# Patient Record
Sex: MALE | Race: WHITE | Employment: OTHER | URBAN - METROPOLITAN AREA
[De-identification: names, ages, dates, MRNs, and addresses within clinical notes are randomized per-mention and may not be internally consistent; named-entity substitution may affect disease eponyms.]

---

## 2023-07-04 ENCOUNTER — HOSPITAL ENCOUNTER (INPATIENT)
Facility: HOSPITAL | Age: 78
LOS: 2 days | Discharge: NURSING, REHAB OR LONG-TERM CARE | End: 2023-07-07
Attending: EMERGENCY MEDICINE | Admitting: INTERNAL MEDICINE
Payer: MEDICARE

## 2023-07-04 ENCOUNTER — APPOINTMENT (OUTPATIENT)
Dept: CT IMAGING | Facility: HOSPITAL | Age: 78
End: 2023-07-04
Attending: EMERGENCY MEDICINE
Payer: MEDICARE

## 2023-07-04 DIAGNOSIS — R53.1 RIGHT SIDED WEAKNESS: ICD-10-CM

## 2023-07-04 DIAGNOSIS — I63.9 ACUTE CVA (CEREBROVASCULAR ACCIDENT) (HCC): Primary | ICD-10-CM

## 2023-07-04 LAB
ALBUMIN SERPL-MCNC: 3.3 G/DL (ref 3.4–5)
ALP LIVER SERPL-CCNC: 61 U/L
ALT SERPL-CCNC: 27 U/L
ANION GAP SERPL CALC-SCNC: 5 MMOL/L (ref 0–18)
APTT PPP: 38 SECONDS (ref 23.3–35.6)
AST SERPL-CCNC: 20 U/L (ref 15–37)
BASOPHILS # BLD AUTO: 0.05 X10(3) UL (ref 0–0.2)
BASOPHILS NFR BLD AUTO: 0.5 %
BILIRUB DIRECT SERPL-MCNC: 0.1 MG/DL (ref 0–0.2)
BILIRUB SERPL-MCNC: 0.8 MG/DL (ref 0.1–2)
BUN BLD-MCNC: 24 MG/DL (ref 7–18)
BUN/CREAT SERPL: 19.5 (ref 10–20)
CALCIUM BLD-MCNC: 8.8 MG/DL (ref 8.5–10.1)
CHLORIDE SERPL-SCNC: 109 MMOL/L (ref 98–112)
CO2 SERPL-SCNC: 28 MMOL/L (ref 21–32)
CREAT BLD-MCNC: 1.23 MG/DL
DEPRECATED RDW RBC AUTO: 44.2 FL (ref 35.1–46.3)
EOSINOPHIL # BLD AUTO: 0.09 X10(3) UL (ref 0–0.7)
EOSINOPHIL NFR BLD AUTO: 0.9 %
ERYTHROCYTE [DISTWIDTH] IN BLOOD BY AUTOMATED COUNT: 13.6 % (ref 11–15)
GFR SERPLBLD BASED ON 1.73 SQ M-ARVRAT: 61 ML/MIN/1.73M2 (ref 60–?)
GLUCOSE BLD-MCNC: 105 MG/DL (ref 70–99)
GLUCOSE BLDC GLUCOMTR-MCNC: 101 MG/DL (ref 70–99)
HCT VFR BLD AUTO: 42.8 %
HGB BLD-MCNC: 14.3 G/DL
IMM GRANULOCYTES # BLD AUTO: 0.03 X10(3) UL (ref 0–1)
IMM GRANULOCYTES NFR BLD: 0.3 %
INR BLD: 1.12 (ref 0.85–1.16)
LYMPHOCYTES # BLD AUTO: 1.55 X10(3) UL (ref 1–4)
LYMPHOCYTES NFR BLD AUTO: 15.5 %
MCH RBC QN AUTO: 29.4 PG (ref 26–34)
MCHC RBC AUTO-ENTMCNC: 33.4 G/DL (ref 31–37)
MCV RBC AUTO: 87.9 FL
MONOCYTES # BLD AUTO: 0.76 X10(3) UL (ref 0.1–1)
MONOCYTES NFR BLD AUTO: 7.6 %
NEUTROPHILS # BLD AUTO: 7.49 X10 (3) UL (ref 1.5–7.7)
NEUTROPHILS # BLD AUTO: 7.49 X10(3) UL (ref 1.5–7.7)
NEUTROPHILS NFR BLD AUTO: 75.2 %
OSMOLALITY SERPL CALC.SUM OF ELEC: 298 MOSM/KG (ref 275–295)
PLATELET # BLD AUTO: 446 10(3)UL (ref 150–450)
POTASSIUM SERPL-SCNC: 4.8 MMOL/L (ref 3.5–5.1)
PROT SERPL-MCNC: 6.9 G/DL (ref 6.4–8.2)
PROTHROMBIN TIME: 14.3 SECONDS (ref 11.6–14.8)
RBC # BLD AUTO: 4.87 X10(6)UL
SARS-COV-2 RNA RESP QL NAA+PROBE: NOT DETECTED
SODIUM SERPL-SCNC: 142 MMOL/L (ref 136–145)
WBC # BLD AUTO: 10 X10(3) UL (ref 4–11)

## 2023-07-04 PROCEDURE — 70496 CT ANGIOGRAPHY HEAD: CPT | Performed by: EMERGENCY MEDICINE

## 2023-07-04 PROCEDURE — 70450 CT HEAD/BRAIN W/O DYE: CPT | Performed by: EMERGENCY MEDICINE

## 2023-07-04 PROCEDURE — 70498 CT ANGIOGRAPHY NECK: CPT | Performed by: EMERGENCY MEDICINE

## 2023-07-04 RX ORDER — DIPHENHYDRAMINE HYDROCHLORIDE 50 MG/ML
50 INJECTION INTRAMUSCULAR; INTRAVENOUS ONCE AS NEEDED
Status: ACTIVE | OUTPATIENT
Start: 2023-07-04 | End: 2023-07-04

## 2023-07-04 RX ORDER — FAMOTIDINE 10 MG/ML
20 INJECTION, SOLUTION INTRAVENOUS ONCE AS NEEDED
Status: ACTIVE | OUTPATIENT
Start: 2023-07-04 | End: 2023-07-04

## 2023-07-04 RX ORDER — METHYLPREDNISOLONE SODIUM SUCCINATE 125 MG/2ML
125 INJECTION, POWDER, LYOPHILIZED, FOR SOLUTION INTRAMUSCULAR; INTRAVENOUS ONCE AS NEEDED
Status: ACTIVE | OUTPATIENT
Start: 2023-07-04 | End: 2023-07-04

## 2023-07-04 RX ORDER — HYDRALAZINE HYDROCHLORIDE 20 MG/ML
10 INJECTION INTRAMUSCULAR; INTRAVENOUS ONCE
Status: COMPLETED | OUTPATIENT
Start: 2023-07-04 | End: 2023-07-04

## 2023-07-05 ENCOUNTER — APPOINTMENT (OUTPATIENT)
Dept: CT IMAGING | Facility: HOSPITAL | Age: 78
End: 2023-07-05
Attending: STUDENT IN AN ORGANIZED HEALTH CARE EDUCATION/TRAINING PROGRAM
Payer: MEDICARE

## 2023-07-05 ENCOUNTER — APPOINTMENT (OUTPATIENT)
Dept: MRI IMAGING | Facility: HOSPITAL | Age: 78
End: 2023-07-05
Attending: STUDENT IN AN ORGANIZED HEALTH CARE EDUCATION/TRAINING PROGRAM
Payer: MEDICARE

## 2023-07-05 ENCOUNTER — APPOINTMENT (OUTPATIENT)
Dept: CV DIAGNOSTICS | Facility: HOSPITAL | Age: 78
End: 2023-07-05
Attending: STUDENT IN AN ORGANIZED HEALTH CARE EDUCATION/TRAINING PROGRAM
Payer: MEDICARE

## 2023-07-05 PROBLEM — R47.81 SLURRED SPEECH: Status: ACTIVE | Noted: 2023-07-05

## 2023-07-05 LAB
ATRIAL RATE: 67 BPM
CHOLEST SERPL-MCNC: 138 MG/DL (ref ?–200)
EST. AVERAGE GLUCOSE BLD GHB EST-MCNC: 111 MG/DL (ref 68–126)
GLUCOSE BLDC GLUCOMTR-MCNC: 101 MG/DL (ref 70–99)
GLUCOSE BLDC GLUCOMTR-MCNC: 105 MG/DL (ref 70–99)
GLUCOSE BLDC GLUCOMTR-MCNC: 110 MG/DL (ref 70–99)
GLUCOSE BLDC GLUCOMTR-MCNC: 92 MG/DL (ref 70–99)
HBA1C MFR BLD: 5.5 % (ref ?–5.7)
HDLC SERPL-MCNC: 35 MG/DL (ref 40–59)
LDLC SERPL CALC-MCNC: 64 MG/DL (ref ?–100)
NONHDLC SERPL-MCNC: 103 MG/DL (ref ?–130)
P AXIS: 64 DEGREES
P-R INTERVAL: 154 MS
Q-T INTERVAL: 420 MS
QRS DURATION: 86 MS
QTC CALCULATION (BEZET): 443 MS
R AXIS: -7 DEGREES
T AXIS: 38 DEGREES
TRIGL SERPL-MCNC: 239 MG/DL (ref 30–149)
VENTRICULAR RATE: 67 BPM
VLDLC SERPL CALC-MCNC: 36 MG/DL (ref 0–30)

## 2023-07-05 PROCEDURE — 70450 CT HEAD/BRAIN W/O DYE: CPT | Performed by: STUDENT IN AN ORGANIZED HEALTH CARE EDUCATION/TRAINING PROGRAM

## 2023-07-05 PROCEDURE — 99223 1ST HOSP IP/OBS HIGH 75: CPT | Performed by: STUDENT IN AN ORGANIZED HEALTH CARE EDUCATION/TRAINING PROGRAM

## 2023-07-05 PROCEDURE — 93306 TTE W/DOPPLER COMPLETE: CPT | Performed by: STUDENT IN AN ORGANIZED HEALTH CARE EDUCATION/TRAINING PROGRAM

## 2023-07-05 PROCEDURE — 99223 1ST HOSP IP/OBS HIGH 75: CPT | Performed by: INTERNAL MEDICINE

## 2023-07-05 PROCEDURE — 70551 MRI BRAIN STEM W/O DYE: CPT | Performed by: STUDENT IN AN ORGANIZED HEALTH CARE EDUCATION/TRAINING PROGRAM

## 2023-07-05 RX ORDER — ATORVASTATIN CALCIUM 20 MG/1
20 TABLET, FILM COATED ORAL DAILY
Status: DISCONTINUED | OUTPATIENT
Start: 2023-07-05 | End: 2023-07-05

## 2023-07-05 RX ORDER — ONDANSETRON 2 MG/ML
4 INJECTION INTRAMUSCULAR; INTRAVENOUS EVERY 6 HOURS PRN
Status: DISCONTINUED | OUTPATIENT
Start: 2023-07-05 | End: 2023-07-07

## 2023-07-05 RX ORDER — ACETAMINOPHEN 325 MG/1
650 TABLET ORAL EVERY 4 HOURS PRN
Status: DISCONTINUED | OUTPATIENT
Start: 2023-07-05 | End: 2023-07-07

## 2023-07-05 RX ORDER — ATORVASTATIN CALCIUM 80 MG/1
80 TABLET, FILM COATED ORAL NIGHTLY
Status: DISCONTINUED | OUTPATIENT
Start: 2023-07-05 | End: 2023-07-07

## 2023-07-05 RX ORDER — LABETALOL HYDROCHLORIDE 5 MG/ML
10 INJECTION, SOLUTION INTRAVENOUS EVERY 10 MIN PRN
Status: DISCONTINUED | OUTPATIENT
Start: 2023-07-05 | End: 2023-07-07

## 2023-07-05 RX ORDER — METOCLOPRAMIDE HYDROCHLORIDE 5 MG/ML
10 INJECTION INTRAMUSCULAR; INTRAVENOUS EVERY 8 HOURS PRN
Status: DISCONTINUED | OUTPATIENT
Start: 2023-07-05 | End: 2023-07-07

## 2023-07-05 RX ORDER — ATORVASTATIN CALCIUM 80 MG/1
80 TABLET, FILM COATED ORAL NIGHTLY
Status: DISCONTINUED | OUTPATIENT
Start: 2023-07-05 | End: 2023-07-05

## 2023-07-05 RX ORDER — LABETALOL HYDROCHLORIDE 5 MG/ML
INJECTION, SOLUTION INTRAVENOUS
Status: DISCONTINUED
Start: 2023-07-05 | End: 2023-07-05 | Stop reason: WASHOUT

## 2023-07-05 RX ORDER — ENOXAPARIN SODIUM 100 MG/ML
40 INJECTION SUBCUTANEOUS DAILY
Status: DISCONTINUED | OUTPATIENT
Start: 2023-07-05 | End: 2023-07-07

## 2023-07-05 RX ORDER — HYDRALAZINE HYDROCHLORIDE 20 MG/ML
10 INJECTION INTRAMUSCULAR; INTRAVENOUS EVERY 2 HOUR PRN
Status: DISCONTINUED | OUTPATIENT
Start: 2023-07-05 | End: 2023-07-07

## 2023-07-05 RX ORDER — ACETAMINOPHEN 650 MG/1
650 SUPPOSITORY RECTAL EVERY 4 HOURS PRN
Status: DISCONTINUED | OUTPATIENT
Start: 2023-07-05 | End: 2023-07-07

## 2023-07-05 RX ORDER — PANTOPRAZOLE SODIUM 40 MG/1
40 TABLET, DELAYED RELEASE ORAL
COMMUNITY

## 2023-07-05 RX ORDER — ASPIRIN 81 MG/1
81 TABLET ORAL DAILY
Status: DISCONTINUED | OUTPATIENT
Start: 2023-07-05 | End: 2023-07-07

## 2023-07-05 RX ORDER — HYDRALAZINE HYDROCHLORIDE 20 MG/ML
INJECTION INTRAMUSCULAR; INTRAVENOUS
Status: DISCONTINUED
Start: 2023-07-05 | End: 2023-07-05 | Stop reason: WASHOUT

## 2023-07-05 NOTE — ED QUICK NOTES
This RN and another provided assistance to patient with standing to urinate per patient request. Patient standing with assistance at side of bedside, yet not steady on feet. Weakness noted. Informed family and patient that urinal should be used in the bed. Plan for inpatient admission.

## 2023-07-05 NOTE — ED INITIAL ASSESSMENT (HPI)
Patient here from home for stroke like symptoms starting at 1700. Right arm is flaccid, right leg weakness, right side facial droop.

## 2023-07-05 NOTE — OCCUPATIONAL THERAPY NOTE
Patient presents with CVA; rcvd TNK @~2100 7/4; per rehab protocol, hold 24 hours post administration; will follow up 7/6.      Mohan Varghese, Occupational Therapist, OTR/L ext [phone]

## 2023-07-05 NOTE — CDS QUERY
How to answer this Query:    1.) DON'T CLICK COSIGN BUTTON FIRST  2.) Click \"3 dots. [place] \" to the right of cosign button and click EDIT on the toolbar.  2.) Type an \"X\" in the bracket for the diagnosis that applies. (You may also add additional clinical details as you feel necessary to substantiate your response). 3.) Finally click \"Sign\" to complete response. Thank You  CLINICAL DOCUMENTATION CLARIFICATION FORM  Dear Sarah Ward    A CAUSE AND EFFECT RELATIONSHIP MAY NOT BE ASSUMED AND MUST BE DOCUMENTED BY A PROVIDER  PLEASE CLARIFY THE RELATIONSHIP, IF ANY, BETWEEN RIGHT SIDE WEAKNESS AND CVA  ( ) DUE TO OR ASSOCIATED WITH EACH OTHER  ( ) UNRELATED TO EACH OTHER  ( ) OTHER     Documentation suggests cerebrovascular disease: [address]=Acute CVA (cerebrovascular accident) ([facility] [address] s/p TNKase  MRI revealed acute infarct , repeat CT if stable start aspirin  Right sided weakness      Other: PT/OT SPEECH EVALS     If you have any questions, please contact Clinical :  Gene Villavicencio RN at [phone]     Thank You!     THIS FORM IS A PERMANENT PART OF THE MEDICAL RECORD

## 2023-07-05 NOTE — PLAN OF CARE
Problem: SAFETY ADULT - FALL  Goal: Free from fall injury  Description: INTERVENTIONS:  - Assess pt frequently for physical needs  - Identify cognitive and physical deficits and behaviors that affect risk of falls. - [place] fall precautions as indicated by assessment.  - Educate pt/family on patient safety including physical limitations  - Instruct pt to call for assistance with activity based on assessment  - Modify environment to reduce risk of injury  - Provide assistive devices as appropriate  - Consider OT/PT consult to assist with strengthening/mobility  - Encourage toileting schedule  Outcome: Progressing     Problem: CARDIOVASCULAR - ADULT  Goal: Maintains optimal cardiac output and hemodynamic stability  Description: INTERVENTIONS:  - Monitor vital signs, rhythm, and trends  - Monitor for bleeding, hypotension and signs of decreased cardiac output  - Evaluate effectiveness of vasoactive medications to optimize hemodynamic stability  - Monitor arterial and/or venous puncture sites for bleeding and/or hematoma  - Assess quality of pulses, skin color and temperature  - Assess for signs of decreased coronary artery perfusion - ex.  Angina  - Evaluate fluid balance, assess for edema, trend weights  Outcome: Progressing  Goal: Absence of cardiac arrhythmias or at baseline  Description: INTERVENTIONS:  - Continuous cardiac monitoring, monitor vital signs, obtain 12 lead EKG if indicated  - Evaluate effectiveness of antiarrhythmic and heart rate control medications as ordered  - Initiate emergency measures for life threatening arrhythmias  - Monitor electrolytes and administer replacement therapy as ordered  Outcome: Progressing     Problem: NEUROLOGICAL - ADULT  Goal: Achieves stable or improved neurological status  Description: INTERVENTIONS  - Assess for and report changes in neurological status  - Initiate measures to prevent increased intracranial pressure  - Maintain blood pressure and fluid volume Results received. Spoke with mom. No questions   within ordered parameters to optimize cerebral perfusion and minimize risk of hemorrhage  - Monitor temperature, glucose, and sodium. Initiate appropriate interventions as ordered  Outcome: Progressing  Goal: Absence of seizures  Description: INTERVENTIONS  - Monitor for seizure activity  - Administer anti-seizure medications as ordered  - Monitor neurological status  Outcome: Progressing  Goal: Remains free of injury related to seizure activity  Description: INTERVENTIONS:  - Maintain airway, patient safety  and administer oxygen as ordered  - Monitor patient for seizure activity, document and report duration and description of seizure to MD/LIP  - If seizure occurs, turn patient to side and suction secretions as needed  - Reorient patient post seizure  - Seizure pads on all 4 side rails  - Instruct patient/family to notify RN of any seizure activity  - Instruct patient/family to call for assistance with activity based on assessment  Outcome: Progressing  Goal: Achieves maximal functionality and self care  Description: INTERVENTIONS  - Monitor swallowing and airway patency with patient fatigue and changes in neurological status  - Encourage and assist patient to increase activity and self care with guidance from PT/OT  - Encourage visually impaired, hearing impaired and aphasic patients to use assistive/communication devices  Outcome: Progressing     Problem: NEUROLOGICAL - ADULT  Goal: Achieves stable or improved neurological status  Description: INTERVENTIONS  - Assess for and report changes in neurological status  - Initiate measures to prevent increased intracranial pressure  - Maintain blood pressure and fluid volume within ordered parameters to optimize cerebral perfusion and minimize risk of hemorrhage  - Monitor temperature, glucose, and sodium.  Initiate appropriate interventions as ordered  Outcome: Progressing  Goal: Absence of seizures  Description: INTERVENTIONS  - Monitor for seizure activity  - Administer anti-seizure medications as ordered  - Monitor neurological status  Outcome: Progressing  Goal: Remains free of injury related to seizure activity  Description: INTERVENTIONS:  - Maintain airway, patient safety  and administer oxygen as ordered  - Monitor patient for seizure activity, document and report duration and description of seizure to MD/LIP  - If seizure occurs, turn patient to side and suction secretions as needed  - Reorient patient post seizure  - Seizure pads on all 4 side rails  - Instruct patient/family to notify RN of any seizure activity  - Instruct patient/family to call for assistance with activity based on assessment  Outcome: Progressing  Goal: Achieves maximal functionality and self care  Description: INTERVENTIONS  - Monitor swallowing and airway patency with patient fatigue and changes in neurological status  - Encourage and assist patient to increase activity and self care with guidance from PT/OT  - Encourage visually impaired, hearing impaired and aphasic patients to use assistive/communication devices  Outcome: Progressing     Problem: CARDIOVASCULAR - ADULT  Goal: Absence of cardiac arrhythmias or at baseline  Description: INTERVENTIONS:  - Continuous cardiac monitoring, monitor vital signs, obtain 12 lead EKG if indicated  - Evaluate effectiveness of antiarrhythmic and heart rate control medications as ordered  - Initiate emergency measures for life threatening arrhythmias  - Monitor electrolytes and administer replacement therapy as ordered  Outcome: Progressing   Rec'd pt from ER, A&Ox4, speech clear, lungs clear, bay, left arm and leg strong, rt arm and leg with weakness, able to move and lift each a few inches off bed, vs and neuro checklist followed thru nite, neuros remain stable with some waxing and waning of strength on right side, but able to lift rt arm and leg off bed again by shift change.   Safety measures maintained sr up with and call light in reach by left hand.

## 2023-07-05 NOTE — ED QUICK NOTES
No promximal clot, no major vessel occlusion. Diminished perfusion to distal L RCA territory, concerning regarding evolving stroke, confirm/rule out via mri.   Relayed information to inpt zoe Ochoa, to inform radiologist.

## 2023-07-05 NOTE — PLAN OF CARE
Pt A&O x4. Has right sided weakness as well as right facial droop. Went for [email or web address] and echo today. Will have repeat head CT at 2100. On Q1H neuro checks. Daily NIH. Family at bedside and updated on plan of care. Problem: HEMATOLOGIC - ADULT  Goal: Free from bleeding injury  Description: (Example usage: patient with low platelets)  INTERVENTIONS:  - Avoid intramuscular injections, enemas and rectal medication administration  - Ensure safe mobilization of patient  - Hold pressure on venipuncture sites to achieve adequate hemostasis  - Assess for signs and symptoms of internal bleeding  - Monitor lab trends  - Patient is to report abnormal signs of bleeding to staff  - Avoid use of toothpicks and dental floss  - Use electric shaver for shaving  - Use soft bristle tooth brush  - Limit straining and forceful nose blowing  Outcome: Progressing     Problem: SAFETY ADULT - FALL  Goal: Free from fall injury  Description: INTERVENTIONS:  - Assess pt frequently for physical needs  - Identify cognitive and physical deficits and behaviors that affect risk of falls.   - [place] fall precautions as indicated by assessment.  - Educate pt/family on patient safety including physical limitations  - Instruct pt to call for assistance with activity based on assessment  - Modify environment to reduce risk of injury  - Provide assistive devices as appropriate  - Consider OT/PT consult to assist with strengthening/mobility  - Encourage toileting schedule  Outcome: Progressing     Problem: CARDIOVASCULAR - ADULT  Goal: Maintains optimal cardiac output and hemodynamic stability  Description: INTERVENTIONS:  - Monitor vital signs, rhythm, and trends  - Monitor for bleeding, hypotension and signs of decreased cardiac output  - Evaluate effectiveness of vasoactive medications to optimize hemodynamic stability  - Monitor arterial and/or venous puncture sites for bleeding and/or hematoma  - Assess quality of pulses, skin color and temperature  - Assess for signs of decreased coronary artery perfusion - ex. Angina  - Evaluate fluid balance, assess for edema, trend weights  Outcome: Progressing     Problem: NEUROLOGICAL - ADULT  Goal: Absence of seizures  Description: INTERVENTIONS  - Monitor for seizure activity  - Administer anti-seizure medications as ordered  - Monitor neurological status  Outcome: Progressing  Goal: Remains free of injury related to seizure activity  Description: INTERVENTIONS:  - Maintain airway, patient safety  and administer oxygen as ordered  - Monitor patient for seizure activity, document and report duration and description of seizure to MD/LIP  - If seizure occurs, turn patient to side and suction secretions as needed  - Reorient patient post seizure  - Seizure pads on all 4 side rails  - Instruct patient/family to notify RN of any seizure activity  - Instruct patient/family to call for assistance with activity based on assessment  Outcome: Progressing     Problem: Patient Centered Care  Goal: Patient preferences are identified and integrated in the patient's plan of care  Description: Interventions:  - What would you like us to know as we care for you?  Speaks primarily Efren  - Provide timely, complete, and accurate information to patient/family  - Incorporate patient and family knowledge, values, beliefs, and cultural backgrounds into the planning and delivery of care  - Encourage patient/family to participate in care and decision-making at the level they choose  - Honor patient and family perspectives and choices  Outcome: Progressing     Problem: CARDIOVASCULAR - ADULT  Goal: Absence of cardiac arrhythmias or at baseline  Description: INTERVENTIONS:  - Continuous cardiac monitoring, monitor vital signs, obtain 12 lead EKG if indicated  - Evaluate effectiveness of antiarrhythmic and heart rate control medications as ordered  - Initiate emergency measures for life threatening arrhythmias  - Monitor electrolytes and administer replacement therapy as ordered  Outcome: Not Progressing     Problem: NEUROLOGICAL - ADULT  Goal: Achieves stable or improved neurological status  Description: INTERVENTIONS  - Assess for and report changes in neurological status  - Initiate measures to prevent increased intracranial pressure  - Maintain blood pressure and fluid volume within ordered parameters to optimize cerebral perfusion and minimize risk of hemorrhage  - Monitor temperature, glucose, and sodium.  Initiate appropriate interventions as ordered  Outcome: Not Progressing  Goal: Achieves maximal functionality and self care  Description: INTERVENTIONS  - Monitor swallowing and airway patency with patient fatigue and changes in neurological status  - Encourage and assist patient to increase activity and self care with guidance from PT/OT  - Encourage visually impaired, hearing impaired and aphasic patients to use assistive/communication devices  Outcome: Not Progressing     Problem: Patient/Family Goals  Goal: Patient/Family Long Term Goal  Description: Patient's Long Term Goal: regain right sided function    Interventions:  - See additional Care Plan goals for specific interventions  Outcome: Not Progressing  Goal: Patient/Family Short Term Goal  Description: Patient's Short Term Goal: increase mobilty    Interventions:   - See additional Care Plan goals for specific interventions  Outcome: Not Progressing

## 2023-07-05 NOTE — SLP NOTE
ADULT SWALLOWING EVALUATION    ASSESSMENT    ASSESSMENT/OVERALL IMPRESSION:  PPE REQUIRED. THIS THERAPIST WORE GLOVES, DROPLET MASK, AND GOGGLES FOR DURATION OF EVALUATION. HANDS WASHED UPON ENTRANCE/EXIT. SLP BSSE orders received and acknowledged. A swallow evaluation warranted secondary to stroke protocol. Pt afebrile with clear vocal quality, on room air, with oxygen saturation at 97. Pt with no prior hx of dysphagia at [address] [place] [address]. Pt positioned upright in bed with family at bedside. Oral [facility] exam completed and R sided facial droop observed with R sided weakness. Pt with adequate oral acceptance and intermittent impaired bilabial seal across trials. Pt with intact bite, mastication of solids, and timely A-P transit. Pt's swallow response appears timely with adequate hyolaryngeal elevation/excursion. No clinical signs of aspiration (e.g., immediate/delayed throat clear, immediate/delayed cough, wet vocal quality, increased O2 effort) observed across all trials of puree, soft solids, hard solids, and thin liquids. Oral/buccal cavities clear of residue following all trials. Oxygen status remained >95 t/o the entire evaluation. At this time, pt presents with mild oral dysphagia and probable pharyngeal dysfunction. Recommend a regular diet and thin liquids with strict adherence to safe swallowing compensatory strategies. Results and recommendations reviewed with RN and family. SLP collaborated with RN for MD diet orders. PLAN: SLP to f/u x2 meal assessments, monitor imaging, VFSS if clinically warranted, and SLE pending MRI results. RECOMMENDATIONS   Diet Recommendations - Solids: Regular  Diet Recommendations - Liquids: Thin Liquids      Compensatory Strategies Recommended: No straws; Slow rate; Alternate consistencies;Small bites and sips  Aspiration Precautions: Upright position; Slow rate;Small bites and sips; No straw  Medication Administration Recommendations: One pill at a time  Treatment Plan/Recommendations: Aspiration precautions  Discharge Recommendations/Plan: Undetermined    HISTORY   MEDICAL HISTORY  Reason for Referral: Stroke protocol    Problem List  Principal Problem:    Acute CVA (cerebrovascular accident) [facility])  Active Problems:    Right sided weakness      Past Medical History  Past Medical History:   Diagnosis Date    BPH 2011    cannot void so catheter indwelling but to have sg 1/11    CORONARY ARTERY DISEASE 2010    +heart scan score 300- stress test done    HYPERLIPIDEMIA     OTHER DISEASES     chr perf of rt tm hx of for 13 yr    OTHER DISEASES     chr lt inguinal hernia    OTHER DISEASES     poor dentition       Prior Living Situation: Home with spouse;Home with support  Diet Prior to Admission: Regular; Thin liquids  Precautions: Aspiration    Patient/Family Goals: Assess swallow for safest/least restrictive diet    SWALLOWING HISTORY  Current Diet Consistency: NPO  Dysphagia History: None at [facility]    Imaging Results:     CT BRAIN 7/4/23:  CONCLUSION:   1. No acute intracranial hemorrhage or conclusive evidence of extended large vessel infarction. 2. Nonspecific hypodensity in the left cerebral hemisphere may reflect subacute infarction. At This could be better delineated by MRI. 3. Senescent changes of parenchymal volume loss with sequela of chronic microvascular ischemic disease. There is also large vessel atherosclerosis. 4. Lesser incidental findings as above. This report was called immediately at 2057 hours to Emergency Department Pod 1 and discussed with the patient's ER physician, Dr. Rupa Gramajo. Dictated by (CST): Sangita Humphries MD on 7/04/2023 at 8:54 PM       Finalized by (CST): Sangita Humphries MD on 7/04/2023 at 8:58 PM       CTA BRAIN 7/4/23:  CONCLUSION:   1. No large vessel occlusion. Relative less vascularity in left terminal cortical MCA territory is concerning for involving left fronto parietal stroke.   Follow-up MRI recommended for further evaluation. This slight discrepancy with the preliminary   vision radiology report was called to the emergency department charge nurse Edith Courtney at 5:40 a.m. Dictated by (CST): Denise Hartman MD on 7/05/2023 at 5:10 AM       Finalized by (CST): Denise Hartman MD on 7/05/2023 at 5:49 AM        Marian Oconnor- remote   OBJECTIVE   ORAL MOTOR EXAMINATION  Dentition: Upper dentures; Lower dentures  Symmetry: Reduced right facial  Strength: Reduced right facial     Range of Motion: Within Functional Limits  Rate of Motion: Within Functional Limits    Voice Quality: Clear  Respiratory Status: Unlabored  Consistencies Trialed: Thin liquids;Puree; Soft solid;Hard solid  Method of Presentation: Self presentation;Staff/Clinician assistance;Spoon;Cup;Single sips  Patient Positioning: Upright;Midline    Oral Phase of Swallow: Impaired  Bolus Retrieval: Intact  Bilabial Seal: Impaired  Bolus Formation: Intact  Bolus Propulsion: Intact  Mastication: Intact       Pharyngeal Phase of Swallow: Within Functional Limits           (Please note: Silent aspiration cannot be evaluated clinically. Videofluoroscopic Swallow Study is required to rule-out silent aspiration.)    Esophageal Phase of Swallow: No complaints consistent with possible esophageal involvement      GOALS  Goal #1 The patient will tolerate regular consistency and thin liquids without overt signs or symptoms of aspiration with 100 % accuracy over 1-2 session(s). In Progress   Goal #2 The patient/family/caregiver will demonstrate understanding and implementation of aspiration precautions and swallow strategies independently over 1-2 session(s). In Progress   Goal #3 The patient will utilize compensatory strategies as outlined by  BSSE (clinical evaluation) including Slow rate, Small bites, L sided placement, Small sips, Alternate liquids/solids, No straws with minimal assistance 100 % of the time across 2 sessions.   In Progress   Goal #4 SLE pending MRI results. In Progress     FOLLOW UP  Treatment Plan/Recommendations: Aspiration precautions  Number of Visits to Meet Established Goals: 2  Follow Up Needed (Documentation Required): Yes  SLP Follow-up Date: 07/06/23    Thank you for your referral.   If you have any questions, please contact Karla Higuera, MIKE [place]  Speech Language Pathologist  Phone Number [phone]

## 2023-07-05 NOTE — ED QUICK NOTES
Bedside report received and introduction to family has been made. Patient remains on cardiac monitor.

## 2023-07-06 LAB
ANION GAP SERPL CALC-SCNC: 7 MMOL/L (ref 0–18)
BASOPHILS # BLD AUTO: 0.03 X10(3) UL (ref 0–0.2)
BASOPHILS NFR BLD AUTO: 0.3 %
BUN BLD-MCNC: 19 MG/DL (ref 7–18)
BUN/CREAT SERPL: 23.5 (ref 10–20)
CALCIUM BLD-MCNC: 8.6 MG/DL (ref 8.5–10.1)
CHLORIDE SERPL-SCNC: 111 MMOL/L (ref 98–112)
CO2 SERPL-SCNC: 22 MMOL/L (ref 21–32)
CREAT BLD-MCNC: 0.81 MG/DL
DEPRECATED RDW RBC AUTO: 44.8 FL (ref 35.1–46.3)
EOSINOPHIL # BLD AUTO: 0.04 X10(3) UL (ref 0–0.7)
EOSINOPHIL NFR BLD AUTO: 0.4 %
ERYTHROCYTE [DISTWIDTH] IN BLOOD BY AUTOMATED COUNT: 13.9 % (ref 11–15)
GFR SERPLBLD BASED ON 1.73 SQ M-ARVRAT: 92 ML/MIN/1.73M2 (ref 60–?)
GLUCOSE BLD-MCNC: 118 MG/DL (ref 70–99)
GLUCOSE BLDC GLUCOMTR-MCNC: 110 MG/DL (ref 70–99)
HCT VFR BLD AUTO: 47 %
HGB BLD-MCNC: 15.7 G/DL
IMM GRANULOCYTES # BLD AUTO: 0.03 X10(3) UL (ref 0–1)
IMM GRANULOCYTES NFR BLD: 0.3 %
LYMPHOCYTES # BLD AUTO: 0.96 X10(3) UL (ref 1–4)
LYMPHOCYTES NFR BLD AUTO: 10 %
MCH RBC QN AUTO: 29.5 PG (ref 26–34)
MCHC RBC AUTO-ENTMCNC: 33.4 G/DL (ref 31–37)
MCV RBC AUTO: 88.3 FL
MONOCYTES # BLD AUTO: 0.79 X10(3) UL (ref 0.1–1)
MONOCYTES NFR BLD AUTO: 8.3 %
NEUTROPHILS # BLD AUTO: 7.71 X10 (3) UL (ref 1.5–7.7)
NEUTROPHILS # BLD AUTO: 7.71 X10(3) UL (ref 1.5–7.7)
NEUTROPHILS NFR BLD AUTO: 80.7 %
OSMOLALITY SERPL CALC.SUM OF ELEC: 293 MOSM/KG (ref 275–295)
PLATELET # BLD AUTO: 414 10(3)UL (ref 150–450)
POTASSIUM SERPL-SCNC: 4 MMOL/L (ref 3.5–5.1)
RBC # BLD AUTO: 5.32 X10(6)UL
SODIUM SERPL-SCNC: 140 MMOL/L (ref 136–145)
WBC # BLD AUTO: 9.6 X10(3) UL (ref 4–11)

## 2023-07-06 PROCEDURE — 99232 SBSQ HOSP IP/OBS MODERATE 35: CPT | Performed by: INTERNAL MEDICINE

## 2023-07-06 PROCEDURE — 99233 SBSQ HOSP IP/OBS HIGH 50: CPT | Performed by: STUDENT IN AN ORGANIZED HEALTH CARE EDUCATION/TRAINING PROGRAM

## 2023-07-06 NOTE — SLP NOTE
SPEECH DAILY NOTE - INPATIENT    ASSESSMENT & PLAN   ASSESSMENT    Proper PPE worn. Hands sanitized upon entrance/exit Pt room. Pt alert, afebrile and on room air. Pt seen for swallow analysis per BSE recommendations (after consulting with RN). Family present. Pt agreed to participate. Pt seen upright in chair with current diet of solid/thin liquids for monitoring diet tolerance. Swallowing precautions/strategies discussed; mild cues needed for execution. Functional bite reflex/mastication/lingual skills on solids. Minimal oral retention cleared with cued liquid wash. Pharyngeal response appeared to trigger within 1-2 sec per hyolaryngeal elevation to completion (functional rise/strength per palpation). No immediate overt clinical signs of aspiration on swallows of solid nor thin liquids (no coughing, no throat clearing, clear vocal quality and no SOB). Delayed cough noted x2 during session. Collaborated with RN regarding Pt's swallowing plan of care. No report of difficulty taking meds. No CXR has been completed. Sp02 ~94% during this session. Call light within Pt's reach upon SLP discharge from room. PLAN:    To f/u x1 session to ensure safe intake of diet and reinforce swallowing/aspiration precautions. To monitor for [facility] OVERLOOK results. Family education as available. Diet Recommendations - Solids: Regular  Diet Recommendations - Liquids: Thin Liquids    Compensatory Strategies Recommended: No straws; Slow rate; Alternate consistencies;Small bites and sips  Aspiration Precautions: Upright position; Slow rate;Small bites and sips; No straw  Medication Administration Recommendations: One pill at a time    Patient Experiencing Pain: No  Discharge Recommendations  Discharge Recommendations/Plan: Undetermined  Treatment Plan  Treatment Plan/Recommendations: Aspiration precautions  Interdisciplinary Communication: Discussed with RN  Plan posted at bedside    GOALS  Goal #1 The patient will tolerate regular consistency and thin liquids without overt signs or symptoms of aspiration with 100 % accuracy over 1-2 session(s). No immediate CSA on solid nor thin liquids during this first session for 100% of trials. Delayed cough x2. Will f/u for safe tolerance and IF ANY CXR is completed. In Progress   Goal #2 The patient/family/caregiver will demonstrate understanding and implementation of aspiration precautions and swallow strategies independently over 1-2 session(s). Reviewed swallowing precautions/strategies with Pt; reinforcement needed. Family v/u. Swallowing precautions written on board in room. In Progress   Goal #3 The patient will utilize compensatory strategies as outlined by  BSSE (clinical evaluation) including Slow rate, Small bites, L sided placement, Small sips, Alternate liquids/solids, No straws with minimal assistance 100 % of the time across 1-2 sessions. Pt upright in chair, self-fed oral trials. Swallowing precautions discussed/trained. Mild cues for SMALL SIPS and alternating consistencies. No straws used. Pt would benefit from additional reinforcement of aspiration precautions. In Progress   Goal #4 SLE pending MRI results. Refer to full report.    GOAL MET     FOLLOW UP  Follow Up Needed (Documentation Required): Yes  SLP Follow-up Date: 07/07/23  Number of Visits to Meet Established Goals: 2    Session:  1    If you have any questions, please contact   Roberto Carlos Tyler M.S. [facility]  #[phone]

## 2023-07-06 NOTE — CM/SW NOTE
Department  notified of request for Acute Rehab, aidin referrals started. Assigned CM/SW to follow up with pt/family on further discharge planning.

## 2023-07-06 NOTE — SLP NOTE
SPEECH/LANGUAGE/COGNITIVE EVALUATION - INPATIENT    Admission Date: 7/4/2023  Evaluation Date: 07/06/23    Reason for Referral: Stroke protocol    ASSESSMENT & PLAN   ASSESSMENT & IMPRESSION    PPE REQUIRED. THIS SLP WORE GLOVES AND DROPLET MASK. HANDS SANITIZED/WASHED UPON ENTRANCE/EXIT. Proper PPE worn. Surgical mask and gloves. Hands sanitized upon entrance/exit Pt room. This Speech Eval was completed 2/2 stroke protocol. Pt admitted 7/04/23 with Acute CVA. Pt resides at home with spouse. No PMH of speech services at [facility]. TNK given 7/04/23. Pt alert, afebrile, on room air and assessed sitting upright in chair. Family present. MRI 7/05/23:  CONCLUSION:   1. Large acute lacunar-type infarct which involves the left frontal corona radiata and extends to the dorsal aspect of the left basal nuclei. No acute intracranial hemorrhage or MR evidence of hemorrhagic conversion. 2. Lesser incidental findings as above. Family reports Pt with \"slurred speech\". To be noted, Pt's is bilingual; however, primary language is [place] with accent noted. Pt was assessed with the Oral Expression Subtest of the BDAE. Results were as follows:                   Non-verbal agility              9/12                 Verbal agility                     9/14                 Word repetition                 9/10                 Sentence repetition          4/5    Pt with mildly reduced reduced labial and lingual skills on the (R) for rate, ROM and strength. Articulation accuracy decreased as length of utterance increased. Imprecise consonant production noted for fricative, plosive and sibilant phonemes. Vocal intensity was reduced; vocal quality judged to be within functional limits. IMPRESSIONS: Pt presents with minimal to mild dysarthria. In conversation, Pt's speech intelligibilty is judged to be 85\% with unfamiliar listener and unknown content. Collaborated with RN regarding Pt's plan of care.      PLAN: Treatment to focus on training OME, verbal agility drills and speech compensatory strategies to improve speech intelligibility in all contexts. Speech Evaluation results/recommendations discussed with Pt and spouse; both v/u. Discharge Recommendations/Plan: Undetermined  Patient Experiencing Pain: No    GOALS  Goal #1 The patient will complete OMEs targeting Lingual, Labial, and Buccal strength, ROM, and coordination with 90% accuracy within  3 session(s). In Progress   Goal #2 Pt to complete verbal agility drills of diadochokinetic exercises with 90% accuracy within 3 sessions. In Progress   Goal #3 Pt to use trained speech compensatory strategies (slow rate, exaggerated articulation, pausing between words) during repetition of 6-8 word functional sentences with 95% accuracy within 3 sessions. In Progress   Goal #4 The patient will use trained speech compensatory strategies of SLOB (slow speech, loud speech, overarticulated/open mouth speech, breath-pause to breath between words) with 90% accuracy within 3 sessions during conversation. In Progress   Prior Living Situation: Home with spouse;Home with support  Prior Level of Function: Independent      Patient/Family Goals: return for all previous function    Interdisciplinary Communication: Discussed with RN    Patient, family and/or caregiver has been informed and has taken part in this evaluation and plan of treatment and have been advised and agree on the findings and goals.       FOLLOW UP  Treatment Plan/Recommendations: Aspiration precautions  Number of Visits to Meet Established Goals: 2  Follow Up Needed (Documentation Required): Yes  SLP Follow-up Date: 07/07/23    Thank you for your referral.  If you have any questions please contact   Kate Alarcon M.S. [facility]  #[phone]

## 2023-07-06 NOTE — CM/SW NOTE
Received MDO for home health evaluation. Spoke with therapy team, recommending Acute Rehab & patient/family agreeable to placement. Requested PM&R consult. Initiated Acute Rehab referral via [facility], will f/u with list of options & choice.     Joanna Araujo, [address]

## 2023-07-06 NOTE — PLAN OF CARE
Problem: HEMATOLOGIC - ADULT  Goal: Free from bleeding injury  Description: (Example usage: patient with low platelets)  INTERVENTIONS:  - Avoid intramuscular injections, enemas and rectal medication administration  - Ensure safe mobilization of patient  - Hold pressure on venipuncture sites to achieve adequate hemostasis  - Assess for signs and symptoms of internal bleeding  - Monitor lab trends  - Patient is to report abnormal signs of bleeding to staff  - Avoid use of toothpicks and dental floss  - Use electric shaver for shaving  - Use soft bristle tooth brush  - Limit straining and forceful nose blowing  Outcome: Progressing     Problem: SAFETY ADULT - FALL  Goal: Free from fall injury  Description: INTERVENTIONS:  - Assess pt frequently for physical needs  - Identify cognitive and physical deficits and behaviors that affect risk of falls. - [place] fall precautions as indicated by assessment.  - Educate pt/family on patient safety including physical limitations  - Instruct pt to call for assistance with activity based on assessment  - Modify environment to reduce risk of injury  - Provide assistive devices as appropriate  - Consider OT/PT consult to assist with strengthening/mobility  - Encourage toileting schedule  Outcome: Progressing     Problem: CARDIOVASCULAR - ADULT  Goal: Maintains optimal cardiac output and hemodynamic stability  Description: INTERVENTIONS:  - Monitor vital signs, rhythm, and trends  - Monitor for bleeding, hypotension and signs of decreased cardiac output  - Evaluate effectiveness of vasoactive medications to optimize hemodynamic stability  - Monitor arterial and/or venous puncture sites for bleeding and/or hematoma  - Assess quality of pulses, skin color and temperature  - Assess for signs of decreased coronary artery perfusion - ex.  Angina  - Evaluate fluid balance, assess for edema, trend weights  Outcome: Progressing  Goal: Absence of cardiac arrhythmias or at baseline  Description: INTERVENTIONS:  - Continuous cardiac monitoring, monitor vital signs, obtain 12 lead EKG if indicated  - Evaluate effectiveness of antiarrhythmic and heart rate control medications as ordered  - Initiate emergency measures for life threatening arrhythmias  - Monitor electrolytes and administer replacement therapy as ordered  Outcome: Progressing     Problem: NEUROLOGICAL - ADULT  Goal: Achieves stable or improved neurological status  Description: INTERVENTIONS  - Assess for and report changes in neurological status  - Initiate measures to prevent increased intracranial pressure  - Maintain blood pressure and fluid volume within ordered parameters to optimize cerebral perfusion and minimize risk of hemorrhage  - Monitor temperature, glucose, and sodium.  Initiate appropriate interventions as ordered  Outcome: Progressing  Goal: Absence of seizures  Description: INTERVENTIONS  - Monitor for seizure activity  - Administer anti-seizure medications as ordered  - Monitor neurological status  Outcome: Progressing  Goal: Remains free of injury related to seizure activity  Description: INTERVENTIONS:  - Maintain airway, patient safety  and administer oxygen as ordered  - Monitor patient for seizure activity, document and report duration and description of seizure to MD/LIP  - If seizure occurs, turn patient to side and suction secretions as needed  - Reorient patient post seizure  - Seizure pads on all 4 side rails  - Instruct patient/family to notify RN of any seizure activity  - Instruct patient/family to call for assistance with activity based on assessment  Outcome: Progressing  Goal: Achieves maximal functionality and self care  Description: INTERVENTIONS  - Monitor swallowing and airway patency with patient fatigue and changes in neurological status  - Encourage and assist patient to increase activity and self care with guidance from PT/OT  - Encourage visually impaired, hearing impaired and aphasic patients to use assistive/communication devices  Outcome: Progressing     Problem: Patient Centered Care  Goal: Patient preferences are identified and integrated in the patient's plan of care  Description: Interventions:  - What would you like us to know as we care for you?  Speaks primarily Efren  - Provide timely, complete, and accurate information to patient/family  - Incorporate patient and family knowledge, values, beliefs, and cultural backgrounds into the planning and delivery of care  - Encourage patient/family to participate in care and decision-making at the level they choose  - Honor patient and family perspectives and choices  Outcome: Progressing     Problem: Patient/Family Goals  Goal: Patient/Family Long Term Goal  Description: Patient's Long Term Goal: regain right sided function    Interventions:  - See additional Care Plan goals for specific interventions  Outcome: Progressing  Goal: Patient/Family Short Term Goal  Description: Patient's Short Term Goal: increase mobilty    Interventions:   - See additional Care Plan goals for specific interventions  Outcome: Progressing

## 2023-07-06 NOTE — PLAN OF CARE
- Patient alert and oriented x4. Has right sided weakness. CT of head negative for bleed. Aspirin and lovenox started last night. Able to make needs known. Call light within reach. Continue neuro checks and NIH daily. Problem: HEMATOLOGIC - ADULT  Goal: Free from bleeding injury  Description: (Example usage: patient with low platelets)  INTERVENTIONS:  - Avoid intramuscular injections, enemas and rectal medication administration  - Ensure safe mobilization of patient  - Hold pressure on venipuncture sites to achieve adequate hemostasis  - Assess for signs and symptoms of internal bleeding  - Monitor lab trends  - Patient is to report abnormal signs of bleeding to staff  - Avoid use of toothpicks and dental floss  - Use electric shaver for shaving  - Use soft bristle tooth brush  - Limit straining and forceful nose blowing  Outcome: Progressing     Problem: SAFETY ADULT - FALL  Goal: Free from fall injury  Description: INTERVENTIONS:  - Assess pt frequently for physical needs  - Identify cognitive and physical deficits and behaviors that affect risk of falls.   - [place] fall precautions as indicated by assessment.  - Educate pt/family on patient safety including physical limitations  - Instruct pt to call for assistance with activity based on assessment  - Modify environment to reduce risk of injury  - Provide assistive devices as appropriate  - Consider OT/PT consult to assist with strengthening/mobility  - Encourage toileting schedule  Outcome: Progressing     Problem: CARDIOVASCULAR - ADULT  Goal: Maintains optimal cardiac output and hemodynamic stability  Description: INTERVENTIONS:  - Monitor vital signs, rhythm, and trends  - Monitor for bleeding, hypotension and signs of decreased cardiac output  - Evaluate effectiveness of vasoactive medications to optimize hemodynamic stability  - Monitor arterial and/or venous puncture sites for bleeding and/or hematoma  - Assess quality of pulses, skin color and temperature  - Assess for signs of decreased coronary artery perfusion - ex. Angina  - Evaluate fluid balance, assess for edema, trend weights  Outcome: Progressing  Goal: Absence of cardiac arrhythmias or at baseline  Description: INTERVENTIONS:  - Continuous cardiac monitoring, monitor vital signs, obtain 12 lead EKG if indicated  - Evaluate effectiveness of antiarrhythmic and heart rate control medications as ordered  - Initiate emergency measures for life threatening arrhythmias  - Monitor electrolytes and administer replacement therapy as ordered  Outcome: Progressing     Problem: NEUROLOGICAL - ADULT  Goal: Achieves stable or improved neurological status  Description: INTERVENTIONS  - Assess for and report changes in neurological status  - Initiate measures to prevent increased intracranial pressure  - Maintain blood pressure and fluid volume within ordered parameters to optimize cerebral perfusion and minimize risk of hemorrhage  - Monitor temperature, glucose, and sodium.  Initiate appropriate interventions as ordered  Outcome: Progressing  Goal: Absence of seizures  Description: INTERVENTIONS  - Monitor for seizure activity  - Administer anti-seizure medications as ordered  - Monitor neurological status  Outcome: Progressing  Goal: Remains free of injury related to seizure activity  Description: INTERVENTIONS:  - Maintain airway, patient safety  and administer oxygen as ordered  - Monitor patient for seizure activity, document and report duration and description of seizure to MD/LIP  - If seizure occurs, turn patient to side and suction secretions as needed  - Reorient patient post seizure  - Seizure pads on all 4 side rails  - Instruct patient/family to notify RN of any seizure activity  - Instruct patient/family to call for assistance with activity based on assessment  Outcome: Progressing  Goal: Achieves maximal functionality and self care  Description: INTERVENTIONS  - Monitor swallowing and airway patency with patient fatigue and changes in neurological status  - Encourage and assist patient to increase activity and self care with guidance from PT/OT  - Encourage visually impaired, hearing impaired and aphasic patients to use assistive/communication devices  Outcome: Not Progressing     Problem: Patient Centered Care  Goal: Patient preferences are identified and integrated in the patient's plan of care  Description: Interventions:  - What would you like us to know as we care for you?  Speaks primarily Efren  - Provide timely, complete, and accurate information to patient/family  - Incorporate patient and family knowledge, values, beliefs, and cultural backgrounds into the planning and delivery of care  - Encourage patient/family to participate in care and decision-making at the level they choose  - Honor patient and family perspectives and choices  Outcome: Progressing     Problem: Patient/Family Goals  Goal: Patient/Family Long Term Goal  Description: Patient's Long Term Goal: regain right sided function    Interventions:  - See additional Care Plan goals for specific interventions  Outcome: Not Progressing  Goal: Patient/Family Short Term Goal  Description: Patient's Short Term Goal: increase mobilty    Interventions:   -PT/OT to evaluate  - See additional Care Plan goals for specific interventions  Outcome: Not Progressing

## 2023-07-06 NOTE — CDS QUERY
How to answer this Query:     1.) DON'T CLICK COSIGN BUTTON FIRST  2.) Click \"3 dots. Jocelyn Jester Jocelyn Jester \" to the right of cosign button and click EDIT on the toolbar.  2.) Type an \"X\" in the bracket for the diagnosis that applies. (You may also add additional clinical details as you feel necessary to substantiate your response). 3.) Finally click \"Sign\" to complete response. Thank You  CLINICAL DOCUMENTATION CLARIFICATION FORM    Dear Gm Vázquez     A CAUSE AND EFFECT RELATIONSHIP MAY NOT BE ASSUMED AND MUST BE DOCUMENTED BY A PROVIDER    PLEASE CLARIFY THE RELATIONSHIP, IF ANY, BETWEEN RIGHT SIDE WEAKNESS AND CVA    ( x) DUE TO OR ASSOCIATED WITH EACH OTHER  ( ) UNRELATED TO EACH OTHER  ( ) OTHER      Documentation suggests cerebrovascular disease: [address]=Acute CVA (cerebrovascular accident) ([facility] [address] s/p TNKase  MRI revealed acute infarct , repeat CT if stable start aspirin  Right sided weakness       Other: PT/OT SPEECH EVALS     If you have any questions, please contact Clinical :  Ele Stevens RN at [phone]     Thank You!      THIS FORM IS A PERMANENT PART OF THE MEDICAL RECORD

## 2023-07-06 NOTE — PHYSICAL THERAPY NOTE
PHYSICAL THERAPY EVALUATION - INPATIENT     Room Number: 235/235-A  Evaluation Date: 7/6/2023  Type of Evaluation: Initial   Physician Order: PT Eval and Treat    Presenting Problem: acute CVA  Co-Morbidities : CAD, HLD, BPH  Reason for Therapy: Mobility Dysfunction and Discharge Planning    PHYSICAL THERAPY ASSESSMENT     Patient is a 78year old male admitted 7/4/2023 for acute CVA. Patient received semi-fowlers in bed, agreeable to physical therapy evaluation. Vital signs monitored as noted below, patient experiencing mild dizziness with transitions, recovering with seated rest, VSS . Pt presents with trace muscle activation at quads and above on RLE, no active dorsiflexion at this time. Pt reports decreased sensation on R side of body. Pt demonstrates decreased trunk control, reliant on LUE support and mod-maxA from behind to prevent posterolateral lean to R. Pt able to tolerate static standing up to ~1 minute with R knee buckling requiring R knee block, unable to advance BLEs to take a step at this time. Pt presents with delayed processing with variable responsiveness to questioning, unclear whether this is language barrier ([facility]) or new cognitive change. Pt also presents with impaired motor planning, requiring increased verbal repetition and tactile cues when directed to perform mobility tasks. R side hemiparesis RUE and RLE.     MOBILITY:  ROLLING: minimal assist   SUPINE TO SIT: moderate assist - at trunk, cues for sequencing  SIT TO STAND: dependent - modAx2 with RLE knee block, tolerated standing ~1 min, leans posteriorly and R with decreased R hip extensor endurance/strength  STAND TO SIT: maximum assist   BED TO/FROM CHAIR: maximum assist - stand-pivot transfer   GAIT: not tested   STAIR NEGOTIATION: not tested     Patient is currently functioning below baseline with bed mobility, transfers, gait, stair negotiation, and performing household tasks (ADLs, carpentry/handiwork) as a result of the following impairments: decreased functional strength, decreased endurance/aerobic capacity, impaired seated and standing balance, impaired coordination, impaired motor planning, cognitive deficits, and limited RUE ROM. Patient history and/or personal factors that may impact the plan of care include home accessibility concerns, cognitive deficits, co-morbidities (CAD, HLD) affecting medical status, endurance, and work requirements (retired bella). Based on the physical therapy examination of the noted systems and functional activity/participation limitations, the patient presentation is unstable given the patient demonstrates worsening of previously stable condition, is experiencing fluctuating levels of dizziness, demonstrates cognitive skills affecting safety, and demonstrates impulsivity/decreased safety awareness. Patient would benefit from continued skilled physical therapy interventions to maximize patient safety and functional independence. Updated nurse on results of session, patient left seated in bedside chair with family present, all lines intact, all needs met with call light in reach. The patient's Approx Degree of Impairment: 81.38% has been calculated based on documentation in the [facility] '6 clicks' Inpatient Basic Mobility Short Form. Research supports that patients with this level of impairment may benefit from long-term care, however based on patient's motivation, independent PLOF, and ability to tolerate 3 hours of therapy/day, recommend acute inpatient rehabilitation. Patient will benefit from continued IP PT services to address these deficits in preparation for discharge. DISCHARGE RECOMMENDATIONS  PT Discharge Recommendations: Acute rehabilitation    PLAN  PT Treatment Plan: Bed mobility; Body mechanics; Coordination; Endurance; Energy conservation;Patient education; Family education;Gait training;Neuromuscular re-educate;Strengthening;Transfer training;Balance training;Range of motion  Rehab Potential : Good  Frequency (Obs): Daily       PHYSICAL THERAPY MEDICAL/SOCIAL HISTORY       Problem List  Principal Problem:    Acute CVA (cerebrovascular accident) ([address]  Active Problems:    Right sided weakness    Slurred speech      HOME SITUATION  Home Situation  Type of Home: House  Home Layout: Multi-level  Stairs to Enter : 4  Railing: Yes  Stairs to Bedroom: 9  Railing: Yes  Lives With: Spouse  Drives: Yes  Patient Owned Equipment: None  Patient Regularly Uses: Glasses     Prior Level of Sod: independent no assistive device, retired bella, 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 PHYSICAL THERAPY EXAMINATION     OBJECTIVE  Precautions: Aspiration;  needed; Other (Comment) (bleeding precautions, [place]-speaking)  Fall Risk: High fall risk    WEIGHT BEARING RESTRICTION  None    PAIN ASSESSMENT  Rating: Unable to rate  Location: R hand  Management Techniques:  Body mechanics;Repositioning    COGNITION  Overall Cognitive Status:  Impaired  Attention Span:  difficulty attending to directions and difficulty dividing attention  Following Commands:  follows one step commands with increased time and follows one step commands with repetition  Motor Planning: impaired    RANGE OF MOTION AND STRENGTH ASSESSMENT  Upper extremity ROM and strength are within functional limits  REN PATEL WFL PROM with trace ROM at shoulder, elbow, wrist, hand, see OT note for full detail  Lower extremity ROM is within functional limits  BLEs  Lower extremity strength is within functional limits except for the following:    Right Hip flexion  2-/5  Right Knee extension  2-/5  Right Knee flexion  1/5  Right Dorsiflexion  0/5    BALANCE  Static Sitting: Fair  Dynamic Sitting: Poor +  Static Standing: Poor -  Dynamic Standing: Dependent    NEUROLOGICAL FINDINGS  Coordination - Finger to Nose: Right decreased speed;Right decreased accuracy  Coordination - Heel to Shin: Right decreased speed;Right decreased accuracy  Coordination - Rapid Alternating Movement: Right decreased speed;Right decreased accuracy  Sensation: diminished RUE and RLE          ACTIVITY TOLERANCE  Pulse: 87 (at rest, 90s with activity)  Heart Rate Source: Monitor     BP: 129/72 (supine pre, 120/68 sitting post)  BP Location: Right arm  BP Method: Automatic  Patient Position: Lying    O2 WALK  Oxygen Therapy  SPO2% on Room Air at Rest: 97  SPO2% Ambulation on Room Air: 95    AM-PAC '6-Clicks' INPATIENT SHORT FORM - BASIC MOBILITY  How much difficulty does the patient currently have. .. Patient Difficulty: Turning over in bed (including adjusting bedclothes, sheets and blankets)?: A Lot   Patient Difficulty: Sitting down on and standing up from a chair with arms (e.g., wheelchair, bedside commode, etc.): Unable   Patient Difficulty: Moving from lying on back to sitting on the side of the bed?: A Lot   How much help from another person does the patient currently need. .. Help from Another: Moving to and from a bed to a chair (including a wheelchair)?: A Lot   Help from Another: Need to walk in hospital room?: Total   Help from Another: Climbing 3-5 steps with a railing?: Total     AM-PAC Score:  Raw Score: 9   Approx Degree of Impairment: 81.38%   Standardized Score (AM-PAC Scale): 30.55   CMS Modifier (G-Code): CM    FUNCTIONAL ABILITY STATUS  Functional Mobility/Gait Assessment  Gait Assistance: Not tested (unable 2/2 poor standing balance/tolerance)    Exercise/Education Provided:  Bed mobility  Body mechanics  Energy conservation  Functional activity tolerated  Gait training  Neuromuscular re-educate  Posture  ROM  Strengthening  Transfer training    Patient End of Session: Up in chair;Needs met;Call light within reach;RN aware of session/findings; Family present    CURRENT GOALS    Goals to be met by: 7/18/23  Patient Goal Patient's self-stated goal is: maximize functional independence and safety   Goal #1 Patient is able to demonstrate supine - sit EOB @ level: minimum assistance     Goal #1   Current Status    Goal #2 Patient is able to demonstrate transfers EOB to/from [facility] at assistance level: minimum assistance with  least restrictive assistive device     Goal #2  Current Status    Goal #3 Patient is able to ambulate 15 feet with assist device:  LRAD  at assistance level: moderate assistance   Goal #3   Current Status    Goal #4 Patient will maintain unsupported seated balance for 2 minutes at a supervision level. Goal #4   Current Status    Goal #5 Patient to demonstrate independence with home activity/exercise instructions provided to patient in preparation for discharge.    Goal #5   Current Status    Goal #6    Goal #6  Current Status      Patient Evaluation Complexity Level:  History High - 3 or more personal factors and/or co-morbidities   Examination of body systems High - addressing a total of 4 or more elements   Clinical Presentation High - Unstable   Clinical Decision Making High Complexity     Therapeutic Activity: 15 minutes  Neuromuscular Re-education: 11 minutes    Pascual Romero PT, DPT  [facility]  Inpatient Rehabilitation  Physical Therapy  [phone]

## 2023-07-06 NOTE — PLAN OF CARE
Problem: HEMATOLOGIC - ADULT  Goal: Free from bleeding injury  Description: (Example usage: patient with low platelets)  INTERVENTIONS:  - Avoid intramuscular injections, enemas and rectal medication administration  - Ensure safe mobilization of patient  - Hold pressure on venipuncture sites to achieve adequate hemostasis  - Assess for signs and symptoms of internal bleeding  - Monitor lab trends  - Patient is to report abnormal signs of bleeding to staff  - Avoid use of toothpicks and dental floss  - Use electric shaver for shaving  - Use soft bristle tooth brush  - Limit straining and forceful nose blowing  Outcome: Progressing     Problem: SAFETY ADULT - FALL  Goal: Free from fall injury  Description: INTERVENTIONS:  - Assess pt frequently for physical needs  - Identify cognitive and physical deficits and behaviors that affect risk of falls. - [place] fall precautions as indicated by assessment.  - Educate pt/family on patient safety including physical limitations  - Instruct pt to call for assistance with activity based on assessment  - Modify environment to reduce risk of injury  - Provide assistive devices as appropriate  - Consider OT/PT consult to assist with strengthening/mobility  - Encourage toileting schedule  Outcome: Progressing     Problem: CARDIOVASCULAR - ADULT  Goal: Maintains optimal cardiac output and hemodynamic stability  Description: INTERVENTIONS:  - Monitor vital signs, rhythm, and trends  - Monitor for bleeding, hypotension and signs of decreased cardiac output  - Evaluate effectiveness of vasoactive medications to optimize hemodynamic stability  - Monitor arterial and/or venous puncture sites for bleeding and/or hematoma  - Assess quality of pulses, skin color and temperature  - Assess for signs of decreased coronary artery perfusion - ex.  Angina  - Evaluate fluid balance, assess for edema, trend weights  Outcome: Progressing  Goal: Absence of cardiac arrhythmias or at baseline  Description: INTERVENTIONS:  - Continuous cardiac monitoring, monitor vital signs, obtain 12 lead EKG if indicated  - Evaluate effectiveness of antiarrhythmic and heart rate control medications as ordered  - Initiate emergency measures for life threatening arrhythmias  - Monitor electrolytes and administer replacement therapy as ordered  Outcome: Progressing     Problem: NEUROLOGICAL - ADULT  Goal: Achieves stable or improved neurological status  Description: INTERVENTIONS  - Assess for and report changes in neurological status  - Initiate measures to prevent increased intracranial pressure  - Maintain blood pressure and fluid volume within ordered parameters to optimize cerebral perfusion and minimize risk of hemorrhage  - Monitor temperature, glucose, and sodium.  Initiate appropriate interventions as ordered  Outcome: Progressing  Goal: Absence of seizures  Description: INTERVENTIONS  - Monitor for seizure activity  - Administer anti-seizure medications as ordered  - Monitor neurological status  Outcome: Progressing  Goal: Remains free of injury related to seizure activity  Description: INTERVENTIONS:  - Maintain airway, patient safety  and administer oxygen as ordered  - Monitor patient for seizure activity, document and report duration and description of seizure to MD/LIP  - If seizure occurs, turn patient to side and suction secretions as needed  - Reorient patient post seizure  - Seizure pads on all 4 side rails  - Instruct patient/family to notify RN of any seizure activity  - Instruct patient/family to call for assistance with activity based on assessment  Outcome: Progressing  Goal: Achieves maximal functionality and self care  Description: INTERVENTIONS  - Monitor swallowing and airway patency with patient fatigue and changes in neurological status  - Encourage and assist patient to increase activity and self care with guidance from PT/OT  - Encourage visually impaired, hearing impaired and aphasic patients to use assistive/communication devices  Outcome: Progressing     Problem: Patient Centered Care  Goal: Patient preferences are identified and integrated in the patient's plan of care  Description: Interventions:  - What would you like us to know as we care for you? Speaks primarily Efren  - Provide timely, complete, and accurate information to patient/family  - Incorporate patient and family knowledge, values, beliefs, and cultural backgrounds into the planning and delivery of care  - Encourage patient/family to participate in care and decision-making at the level they choose  - Honor patient and family perspectives and choices  Outcome: Progressing   Pt Aox4. Famil at bedside. RA. PT/OT today. Max assist. R sided weakness. R facial droop. Neuros Q4; no acute changes noted. SCDs on. All needs met. Bed low and locked. Call light within reach.

## 2023-07-07 VITALS
SYSTOLIC BLOOD PRESSURE: 140 MMHG | HEART RATE: 83 BPM | WEIGHT: 157.63 LBS | BODY MASS INDEX: 25.33 KG/M2 | HEIGHT: 66 IN | DIASTOLIC BLOOD PRESSURE: 81 MMHG | RESPIRATION RATE: 16 BRPM | TEMPERATURE: 98 F | OXYGEN SATURATION: 97 %

## 2023-07-07 PROCEDURE — 99232 SBSQ HOSP IP/OBS MODERATE 35: CPT | Performed by: PHYSICIAN ASSISTANT

## 2023-07-07 RX ORDER — ASPIRIN 81 MG/1
81 TABLET ORAL DAILY
Qty: 30 TABLET | Refills: 3 | Status: SHIPPED | OUTPATIENT
Start: 2023-07-08

## 2023-07-07 RX ORDER — ATORVASTATIN CALCIUM 80 MG/1
80 TABLET, FILM COATED ORAL NIGHTLY
Qty: 30 TABLET | Refills: 1 | Status: SHIPPED | OUTPATIENT
Start: 2023-07-07

## 2023-07-07 RX ORDER — PANTOPRAZOLE SODIUM 40 MG/1
40 TABLET, DELAYED RELEASE ORAL
Status: DISCONTINUED | OUTPATIENT
Start: 2023-07-07 | End: 2023-07-07

## 2023-07-07 NOTE — PLAN OF CARE
Problem: HEMATOLOGIC - ADULT  Goal: Free from bleeding injury  Description: (Example usage: patient with low platelets)  INTERVENTIONS:  - Avoid intramuscular injections, enemas and rectal medication administration  - Ensure safe mobilization of patient  - Hold pressure on venipuncture sites to achieve adequate hemostasis  - Assess for signs and symptoms of internal bleeding  - Monitor lab trends  - Patient is to report abnormal signs of bleeding to staff  - Avoid use of toothpicks and dental floss  - Use electric shaver for shaving  - Use soft bristle tooth brush  - Limit straining and forceful nose blowing  Outcome: Progressing     Problem: SAFETY ADULT - FALL  Goal: Free from fall injury  Description: INTERVENTIONS:  - Assess pt frequently for physical needs  - Identify cognitive and physical deficits and behaviors that affect risk of falls. - [place] fall precautions as indicated by assessment.  - Educate pt/family on patient safety including physical limitations  - Instruct pt to call for assistance with activity based on assessment  - Modify environment to reduce risk of injury  - Provide assistive devices as appropriate  - Consider OT/PT consult to assist with strengthening/mobility  - Encourage toileting schedule  Outcome: Progressing     Problem: CARDIOVASCULAR - ADULT  Goal: Maintains optimal cardiac output and hemodynamic stability  Description: INTERVENTIONS:  - Monitor vital signs, rhythm, and trends  - Monitor for bleeding, hypotension and signs of decreased cardiac output  - Evaluate effectiveness of vasoactive medications to optimize hemodynamic stability  - Monitor arterial and/or venous puncture sites for bleeding and/or hematoma  - Assess quality of pulses, skin color and temperature  - Assess for signs of decreased coronary artery perfusion - ex.  Angina  - Evaluate fluid balance, assess for edema, trend weights  Outcome: Progressing  Goal: Absence of cardiac arrhythmias or at baseline  Description: INTERVENTIONS:  - Continuous cardiac monitoring, monitor vital signs, obtain 12 lead EKG if indicated  - Evaluate effectiveness of antiarrhythmic and heart rate control medications as ordered  - Initiate emergency measures for life threatening arrhythmias  - Monitor electrolytes and administer replacement therapy as ordered  Outcome: Progressing     Problem: NEUROLOGICAL - ADULT  Goal: Achieves stable or improved neurological status  Description: INTERVENTIONS  - Assess for and report changes in neurological status  - Initiate measures to prevent increased intracranial pressure  - Maintain blood pressure and fluid volume within ordered parameters to optimize cerebral perfusion and minimize risk of hemorrhage  - Monitor temperature, glucose, and sodium.  Initiate appropriate interventions as ordered  Outcome: Progressing  Goal: Absence of seizures  Description: INTERVENTIONS  - Monitor for seizure activity  - Administer anti-seizure medications as ordered  - Monitor neurological status  Outcome: Progressing  Goal: Remains free of injury related to seizure activity  Description: INTERVENTIONS:  - Maintain airway, patient safety  and administer oxygen as ordered  - Monitor patient for seizure activity, document and report duration and description of seizure to MD/LIP  - If seizure occurs, turn patient to side and suction secretions as needed  - Reorient patient post seizure  - Seizure pads on all 4 side rails  - Instruct patient/family to notify RN of any seizure activity  - Instruct patient/family to call for assistance with activity based on assessment  Outcome: Progressing  Goal: Achieves maximal functionality and self care  Description: INTERVENTIONS  - Monitor swallowing and airway patency with patient fatigue and changes in neurological status  - Encourage and assist patient to increase activity and self care with guidance from PT/OT  - Encourage visually impaired, hearing impaired and aphasic patients to use assistive/communication devices  Outcome: Progressing     Problem: Patient Centered Care  Goal: Patient preferences are identified and integrated in the patient's plan of care  Description: Interventions:  - What would you like us to know as we care for you? Speaks primarily Efren  - Provide timely, complete, and accurate information to patient/family  - Incorporate patient and family knowledge, values, beliefs, and cultural backgrounds into the planning and delivery of care  - Encourage patient/family to participate in care and decision-making at the level they choose  - Honor patient and family perspectives and choices  Outcome: Progressing     Pt Aox4. RA. PT/OT today. Pt is stand pivot with RW. Neuros q4; no acute changes. Neurology signed off. Pt will be discharging today to acute rehab; [facility]. Transport set up for 6pm. Pt and family agree with plan of care. Waiting for discharge orders. All needs. Bed low and locked. Call light within reach. Will continue to monitor.

## 2023-07-07 NOTE — PLAN OF CARE
Problem: HEMATOLOGIC - ADULT  Goal: Free from bleeding injury  Description: (Example usage: patient with low platelets)  INTERVENTIONS:  - Avoid intramuscular injections, enemas and rectal medication administration  - Ensure safe mobilization of patient  - Hold pressure on venipuncture sites to achieve adequate hemostasis  - Assess for signs and symptoms of internal bleeding  - Monitor lab trends  - Patient is to report abnormal signs of bleeding to staff  - Avoid use of toothpicks and dental floss  - Use electric shaver for shaving  - Use soft bristle tooth brush  - Limit straining and forceful nose blowing  7/7/2023 1657 by Brian Barbosa RN  Outcome: Adequate for Discharge  7/7/2023 1248 by Brian Barbosa RN  Outcome: Progressing     Problem: SAFETY ADULT - FALL  Goal: Free from fall injury  Description: INTERVENTIONS:  - Assess pt frequently for physical needs  - Identify cognitive and physical deficits and behaviors that affect risk of falls.   - [place] fall precautions as indicated by assessment.  - Educate pt/family on patient safety including physical limitations  - Instruct pt to call for assistance with activity based on assessment  - Modify environment to reduce risk of injury  - Provide assistive devices as appropriate  - Consider OT/PT consult to assist with strengthening/mobility  - Encourage toileting schedule  7/7/2023 1657 by Brian Barbosa RN  Outcome: Adequate for Discharge  7/7/2023 1248 by Brian Barbosa RN  Outcome: Progressing     Problem: CARDIOVASCULAR - ADULT  Goal: Maintains optimal cardiac output and hemodynamic stability  Description: INTERVENTIONS:  - Monitor vital signs, rhythm, and trends  - Monitor for bleeding, hypotension and signs of decreased cardiac output  - Evaluate effectiveness of vasoactive medications to optimize hemodynamic stability  - Monitor arterial and/or venous puncture sites for bleeding and/or hematoma  - Assess quality of pulses, skin color and temperature  - Assess for signs of decreased coronary artery perfusion - ex. Angina  - Evaluate fluid balance, assess for edema, trend weights  7/7/2023 1657 by Erik Owusu RN  Outcome: Adequate for Discharge  7/7/2023 1248 by Erik Owusu RN  Outcome: Progressing  Goal: Absence of cardiac arrhythmias or at baseline  Description: INTERVENTIONS:  - Continuous cardiac monitoring, monitor vital signs, obtain 12 lead EKG if indicated  - Evaluate effectiveness of antiarrhythmic and heart rate control medications as ordered  - Initiate emergency measures for life threatening arrhythmias  - Monitor electrolytes and administer replacement therapy as ordered  7/7/2023 1657 by Erik Owusu RN  Outcome: Adequate for Discharge  7/7/2023 1248 by Erik Owusu RN  Outcome: Progressing     Problem: NEUROLOGICAL - ADULT  Goal: Achieves stable or improved neurological status  Description: INTERVENTIONS  - Assess for and report changes in neurological status  - Initiate measures to prevent increased intracranial pressure  - Maintain blood pressure and fluid volume within ordered parameters to optimize cerebral perfusion and minimize risk of hemorrhage  - Monitor temperature, glucose, and sodium.  Initiate appropriate interventions as ordered  7/7/2023 1657 by Erik Owusu RN  Outcome: Adequate for Discharge  7/7/2023 1248 by Erik Owusu RN  Outcome: Progressing  Goal: Absence of seizures  Description: INTERVENTIONS  - Monitor for seizure activity  - Administer anti-seizure medications as ordered  - Monitor neurological status  7/7/2023 1657 by Erik Owusu RN  Outcome: Adequate for Discharge  7/7/2023 1248 by Erik Owusu RN  Outcome: Progressing  Goal: Remains free of injury related to seizure activity  Description: INTERVENTIONS:  - Maintain airway, patient safety  and administer oxygen as ordered  - Monitor patient for seizure activity, document and report duration and description of seizure to MD/LIP  - If seizure occurs, turn patient to side and suction secretions as needed  - Reorient patient post seizure  - Seizure pads on all 4 side rails  - Instruct patient/family to notify RN of any seizure activity  - Instruct patient/family to call for assistance with activity based on assessment  7/7/2023 1657 by Rancho Ovalles RN  Outcome: Adequate for Discharge  7/7/2023 1248 by Rancho Ovalles RN  Outcome: Progressing  Goal: Achieves maximal functionality and self care  Description: INTERVENTIONS  - Monitor swallowing and airway patency with patient fatigue and changes in neurological status  - Encourage and assist patient to increase activity and self care with guidance from PT/OT  - Encourage visually impaired, hearing impaired and aphasic patients to use assistive/communication devices  7/7/2023 1657 by Rancho Ovalles RN  Outcome: Adequate for Discharge  7/7/2023 1248 by Rancho Ovalles RN  Outcome: Progressing     Problem: Patient/Family Goals  Goal: Patient/Family Long Term Goal  Description: Patient's Long Term Goal: regain right sided function    Interventions:  - See additional Care Plan goals for specific interventions  7/7/2023 1657 by Rancho Ovalles RN  Outcome: Adequate for Discharge  7/7/2023 1248 by Rancho Ovalles RN  Outcome: Progressing  Goal: Patient/Family Short Term Goal  Description: Patient's Short Term Goal: increase mobilty    Interventions:   - See additional Care Plan goals for specific interventions  7/7/2023 1657 by Rancho Ovalles RN  Outcome: Adequate for Discharge  7/7/2023 1248 by Rancho Ovalles RN    Bed available at [facility]. Pt going to room 2287. Report called in to receiving [facility] Discharge instructions reviewed with patient and patient wife at bedside. All questions answered. [facility] scheduled for 6pm.  Will continue to monitor.

## 2023-07-07 NOTE — PHYSICAL THERAPY NOTE
PHYSICAL THERAPY TREATMENT NOTE - INPATIENT     Room Number: 340/340-A       Presenting Problem: acute CVA  Co-Morbidities : CAD, HLD, BPH    Problem List  Principal Problem:    Acute CVA (cerebrovascular accident) [facility])  Active Problems:    Right sided weakness    Slurred speech      PHYSICAL THERAPY ASSESSMENT     RN cleared pt to participate in f/u PT session this morning, pt received in bed at start with spouse at bedside. Coordinated session with OT. Pt demonstrating good initiation of R UE/LE active movement against gravity. Pt with progressive improvement in elbow flexion on R with repetition. Pt required mod A x 1 for supine to sit toward L side of bed. Pt with good initiation of movement laterally. Good static sit balance, fair dynamic sit balance. Gait belt donned. Pt completed STS from EOB and chair x 3 reps each with min A x 2. Stand step transfer bed to chair performed with mod A x 2, good initiation of stepping during transfer. Gait training completed from chair tolerated 6 ft x 2 with chair follow, hemiwalker and mod A x 2. Therapist assist to R LE for progression and stepping as well as blocking of R knee during stance to prevent buckling. Pt tolerated well, fatigued afterward. Returned to chair at end of session, needs left in reach and RN aware of session outcome. The patient's Approx Degree of Impairment: 72.57% has been calculated based on documentation in the [facility] '6 clicks' Inpatient Basic Mobility Short Form. Research supports that patients with this level of impairment may benefit from acute rehab. Great rehab potential to return to ambulatory functional status. DISCHARGE RECOMMENDATIONS  PT Discharge Recommendations: Acute rehabilitation     PLAN  PT Treatment Plan: Bed mobility; Body mechanics; Coordination; Endurance; Energy conservation;Patient education;Gait training;Neuromuscular re-educate;Range of motion;Transfer training;Balance training  Frequency (Obs): Daily    SUBJECTIVE  \"Sometimes it just moves without me thinking. \"     OBJECTIVE  Precautions: Aspiration;Limb alert - right;  needed    WEIGHT BEARING RESTRICTION                PAIN ASSESSMENT   Rating: Unable to rate  Location: R hand  Management Techniques: Body mechanics;Repositioning    BALANCE  Static Sitting: Good  Dynamic Sitting: Fair  Static Standing: Poor +  Dynamic Standing: Poor -      AM-PAC '6-Clicks' INPATIENT SHORT FORM - BASIC MOBILITY  How much difficulty does the patient currently have. .. Patient Difficulty: Turning over in bed (including adjusting bedclothes, sheets and blankets)?: A Lot   Patient Difficulty: Sitting down on and standing up from a chair with arms (e.g., wheelchair, bedside commode, etc.): A Lot   Patient Difficulty: Moving from lying on back to sitting on the side of the bed?: A Lot   How much help from another person does the patient currently need. .. Help from Another: Moving to and from a bed to a chair (including a wheelchair)?: A Lot   Help from Another: Need to walk in hospital room?: A Lot   Help from Another: Climbing 3-5 steps with a railing?: Total     AM-PAC Score:  Raw Score: 11   Approx Degree of Impairment: 72.57%   Standardized Score (AM-PAC Scale): 33.86   CMS Modifier (G-Code): CL    FUNCTIONAL ABILITY STATUS  Functional Mobility/Gait Assessment  Gait Assistance:  (mod A x 2)  Distance (ft): 6 ft x 2  Assistive Device: Other (Comment) (hemiwalker)  Pattern:  (assistp rovided to progress R LE/placement and also blocking of R knee during stance)      Patient End of Session: Up in chair;Needs met;Call light within reach;RN aware of session/findings; All patient questions and concerns addressed; Alarm set; Family present    CURRENT GOALS   Goals to be met by: 7/18/23  Patient Goal Patient's self-stated goal is: maximize functional independence and safety   Goal #1 Patient is able to demonstrate supine - sit EOB @ level: minimum assistance      Goal #1 Current Status  Progressing: mod A x 1   Goal #2 Patient is able to demonstrate transfers EOB to/from [facility] at assistance level: minimum assistance with  least restrictive assistive device      Goal #2  Current Status  Progressing: mod A x 2   Goal #3 Patient is able to ambulate 15 feet with assist device:  LRAD  at assistance level: moderate assistance   Goal #3   Current Status  Progressing: mod A x 2 with hemiwalker and chair follow   Goal #4 Patient will maintain unsupported seated balance for 2 minutes at a supervision level. Goal #4   Current Status  Progressing   Goal #5 Patient to demonstrate independence with home activity/exercise instructions provided to patient in preparation for discharge.    Goal #5   Current Status  Progressing     Gait Training: 15 minutes  Therapeutic Activity: 10 minutes

## 2023-07-07 NOTE — PLAN OF CARE
Patient A&Ox4 on RA. R sided weakness and R facial droop. Occasional PACs & PVCs. IV hydralazine given for elevated BP. On lovenox and SCDs on for PE/DVT prophylaxis. Safety precautions maintained, call light and personal belongings within reach. Plan is for Acute rehab when medically cleared. Problem: HEMATOLOGIC - ADULT  Goal: Free from bleeding injury  Description: (Example usage: patient with low platelets)  INTERVENTIONS:  - Avoid intramuscular injections, enemas and rectal medication administration  - Ensure safe mobilization of patient  - Hold pressure on venipuncture sites to achieve adequate hemostasis  - Assess for signs and symptoms of internal bleeding  - Monitor lab trends  - Patient is to report abnormal signs of bleeding to staff  - Avoid use of toothpicks and dental floss  - Use electric shaver for shaving  - Use soft bristle tooth brush  - Limit straining and forceful nose blowing  Outcome: Progressing     Problem: SAFETY ADULT - FALL  Goal: Free from fall injury  Description: INTERVENTIONS:  - Assess pt frequently for physical needs  - Identify cognitive and physical deficits and behaviors that affect risk of falls.   - [place] fall precautions as indicated by assessment.  - Educate pt/family on patient safety including physical limitations  - Instruct pt to call for assistance with activity based on assessment  - Modify environment to reduce risk of injury  - Provide assistive devices as appropriate  - Consider OT/PT consult to assist with strengthening/mobility  - Encourage toileting schedule  Outcome: Progressing     Problem: CARDIOVASCULAR - ADULT  Goal: Maintains optimal cardiac output and hemodynamic stability  Description: INTERVENTIONS:  - Monitor vital signs, rhythm, and trends  - Monitor for bleeding, hypotension and signs of decreased cardiac output  - Evaluate effectiveness of vasoactive medications to optimize hemodynamic stability  - Monitor arterial and/or venous puncture sites for bleeding and/or hematoma  - Assess quality of pulses, skin color and temperature  - Assess for signs of decreased coronary artery perfusion - ex. Angina  - Evaluate fluid balance, assess for edema, trend weights  Outcome: Progressing  Goal: Absence of cardiac arrhythmias or at baseline  Description: INTERVENTIONS:  - Continuous cardiac monitoring, monitor vital signs, obtain 12 lead EKG if indicated  - Evaluate effectiveness of antiarrhythmic and heart rate control medications as ordered  - Initiate emergency measures for life threatening arrhythmias  - Monitor electrolytes and administer replacement therapy as ordered  Outcome: Progressing     Problem: NEUROLOGICAL - ADULT  Goal: Achieves stable or improved neurological status  Description: INTERVENTIONS  - Assess for and report changes in neurological status  - Initiate measures to prevent increased intracranial pressure  - Maintain blood pressure and fluid volume within ordered parameters to optimize cerebral perfusion and minimize risk of hemorrhage  - Monitor temperature, glucose, and sodium.  Initiate appropriate interventions as ordered  Outcome: Progressing  Goal: Absence of seizures  Description: INTERVENTIONS  - Monitor for seizure activity  - Administer anti-seizure medications as ordered  - Monitor neurological status  Outcome: Progressing  Goal: Remains free of injury related to seizure activity  Description: INTERVENTIONS:  - Maintain airway, patient safety  and administer oxygen as ordered  - Monitor patient for seizure activity, document and report duration and description of seizure to MD/LIP  - If seizure occurs, turn patient to side and suction secretions as needed  - Reorient patient post seizure  - Seizure pads on all 4 side rails  - Instruct patient/family to notify RN of any seizure activity  - Instruct patient/family to call for assistance with activity based on assessment  Outcome: Progressing  Goal: Achieves maximal functionality and self care  Description: INTERVENTIONS  - Monitor swallowing and airway patency with patient fatigue and changes in neurological status  - Encourage and assist patient to increase activity and self care with guidance from PT/OT  - Encourage visually impaired, hearing impaired and aphasic patients to use assistive/communication devices  Outcome: Progressing     Problem: Patient Centered Care  Goal: Patient preferences are identified and integrated in the patient's plan of care  Description: Interventions:  - What would you like us to know as we care for you?  Speaks primarily Efren  - Provide timely, complete, and accurate information to patient/family  - Incorporate patient and family knowledge, values, beliefs, and cultural backgrounds into the planning and delivery of care  - Encourage patient/family to participate in care and decision-making at the level they choose  - Honor patient and family perspectives and choices  Outcome: Progressing     Problem: Patient/Family Goals  Goal: Patient/Family Long Term Goal  Description: Patient's Long Term Goal: regain right sided function    Interventions:  - See additional Care Plan goals for specific interventions  Outcome: Progressing  Goal: Patient/Family Short Term Goal  Description: Patient's Short Term Goal: increase mobilty    Interventions:   - See additional Care Plan goals for specific interventions  Outcome: Progressing

## 2023-07-07 NOTE — DISCHARGE SUMMARY
[facility]    Discharge Summary    Demetrice Crook Patient Status:  Inpatient    1945 MRN [id number]   Location [facility] 3W/SW Attending Addie Valle MD   [facility] Day # 2 PCP No primary care provider on file. Date of Admission: 2023   Date of Discharge: 2023    Admitting Diagnosis: Right sided weakness [R53.1]  Acute CVA (cerebrovascular accident) ([address] [I63.9]    Disposition: [facility]     Discharge Diagnosis: . Principal Problem:    Acute CVA (cerebrovascular accident) [facility]  Active Problems:    Right sided weakness    Slurred speech      Hospital Course:   Reason for Admission: Weakness    Discharge Physical Exam: General appearance: alert, appears stated age, and cooperative  Pulmonary:  clear to auscultation bilaterally  Cardiovascular: S1, S2 normal, no murmur, click, rub or gallop, regular rate and rhythm, S1, S2 normal  Abdominal: soft, non-tender; bowel sounds normal; no masses,  no organomegaly  Extremities: extremities normal, atraumatic, no cyanosis or edema  Skin: Skin color, texture, turgor normal. No rashes or lesions    Hospital Course: Patient had stroke seen by neurology DC to [facility]    Complications: None    Consultants         Provider   Role Specialty     Serafin Jones MD  Consulting Physician NEUROLOGY     Nancy Cowan,   Consulting Physician Emergency Medicine     Harsh Delgado MD  Consulting Physician PULMONARY DISEASES     Talisha Thompson,   Consulting Physician PULMONARY DISEASES     Mattie Quiroga MD  Consulting Physician Physical Medicine                Discharge Plan:   Discharge Condition: Stable    Current Discharge Medication List    New Orders    aspirin 81 MG Oral Tab EC  Take 1 tablet (81 mg total) by mouth daily. Home Meds - Modified    atorvastatin 80 MG Oral Tab  Take 1 tablet (80 mg total) by mouth nightly.       Home Meds - Unchanged    pantoprazole 40 MG Oral Tab EC  Take 1 tablet (40 mg total) by mouth every morning before breakfast.              Discharge Diet: Cardiac    Discharge Activity: As tolerated    Follow up: Follow-up Information       J Luis Patel MD. Schedule an appointment as soon as possible for a visit in 6 week(s). Specialty: NEUROLOGY  Contact information:  [place]  [phone]                             Total time spent 30 minutes      Twin Mariscal MD, Oma Henderson MD  7/7/2023

## 2023-07-07 NOTE — CM/SW NOTE
07/07/23 1211   Choice of Post-Acute Provider   Informed patient of right to choose their preferred provider Yes   List of appropriate post-acute services provided to patient/family with quality data Yes   Patient/family choice [facility]   Information given to Patient;Spouse/Significant other         Met with pt and pt's wife at bedside to discuss DC Planning for acute rehab. PMR consult pending. Pt/wife confirmed preference for [facility] acute rehab at [place]. Facility reserved in [facility] from [facility] [facility] who confirmed PMR to recommend acute rehab. Note pending. Beds available today. Updated RN who will confirm medical readiness for DC today. [facility] transport scheduled for 545/6pm.  PCS form completed. Await medical clearance for DC. / to remain available for support and/or discharge planning. Samina Roberts [facility]  Discharge Planner  [phone]    Addendum:  Updated by RN that pt is cleared for DC today. Informed by Cristiane with  that pt will go to room 2287,  RN to call report to [phone]. Updated pt/spouse at bedside and informed them of costs of medicar transport. No further needs at this time.

## 2023-08-22 ENCOUNTER — OFFICE VISIT (OUTPATIENT)
Dept: NEUROLOGY | Facility: CLINIC | Age: 78
End: 2023-08-22
Payer: MEDICARE

## 2023-08-22 VITALS — DIASTOLIC BLOOD PRESSURE: 85 MMHG | HEART RATE: 75 BPM | SYSTOLIC BLOOD PRESSURE: 149 MMHG | OXYGEN SATURATION: 96 %

## 2023-08-22 DIAGNOSIS — R53.1 RIGHT SIDED WEAKNESS: ICD-10-CM

## 2023-08-22 DIAGNOSIS — I63.9 ACUTE CVA (CEREBROVASCULAR ACCIDENT) (HCC): Primary | ICD-10-CM

## 2023-08-22 PROCEDURE — 99213 OFFICE O/P EST LOW 20 MIN: CPT | Performed by: STUDENT IN AN ORGANIZED HEALTH CARE EDUCATION/TRAINING PROGRAM

## 2023-08-22 PROCEDURE — 1111F DSCHRG MED/CURRENT MED MERGE: CPT | Performed by: STUDENT IN AN ORGANIZED HEALTH CARE EDUCATION/TRAINING PROGRAM

## 2023-08-22 RX ORDER — LIDOCAINE 50 MG/G
1 PATCH TOPICAL DAILY PRN
COMMUNITY
Start: 2023-08-17

## 2023-08-22 RX ORDER — TAMSULOSIN HYDROCHLORIDE 0.4 MG/1
1 CAPSULE ORAL NIGHTLY
COMMUNITY
Start: 2023-08-11

## 2023-08-22 NOTE — PROGRESS NOTES
[address]  [address] [facility], [address] [place] [address]  [phone]              Date: August 22, 2023  Patient Name: Radu Rodgers   MRN: [id number]    Reason for Evaluation: Hospital follow-up    HPI:     Radu Rodgers is a 78year old  male with a past medical history of CAD, hyperlipidemia, BPH was seen in the clinic for hospital follow-up    Patient presented to the ER on 7/4/2023 for right upper and lower extremity numbness and weakness. As per patient at around 5 to 5:30 PM he started having numbness in his right leg, initially the numbness was coming and going but then he started having numbness in his right shoulder around 8 PM.  EMS was called, patient was able to ambulate to the chair at the time when he arrived in the ER he was having weakness in both right upper and lower extremities. Denies any headaches or vision changes or facial weakness or speech difficulty or slurring of words. Denies any prior history of strokes. He was not on any blood thinners prior to presentation. CT head 7/4/2023 did not show any acute intracranial abnormality. Nonspecific hypodensity noted in the left cerebral hemisphere. Chronic microvascular ischemic changes noted. CTA head and neck 7/4/2023 did not show any large vessel occlusion, relative rest vascularity in the left terminal cortical MCA territory concerning for evolving left frontoparietal infarct. After coming back from the CT scan, patient was reevaluated by ER physician, he did not have any sensations in the right upper and lower extremities and no movements of the right upper and lower extremities, was noted to have facial droop on the right side along with the slurred speech. He received IV tenecteplase at 2109. Patient was not an endovascular thrombectomy candidate as no large vessel occlusion noted on CTA head and neck. He was admitted in the ICU for further management.  Prior to presentation patient was not on any antiplatelets or anticoagulants. MRI brain showed- large acute infarct involving left frontal corona radiator extending to dorsal aspect of the left basal nuclei. Repeat 24-hour CT head was negative for hemorrhagic changes. Patient was treated with aspirin 81 mg and Lipitor. Patient was then discharged to rehab once his medical condition was stabilized    Since discharge from the hospital, patient endorses significant improvement in the right upper and lower extremity weakness, initially he was unable to move his right upper or lower extremity, now he can move his upper and lower extremity much better. Denies any problem with speech. Currently he is able to walk using a 4 quad cane. After being discharged from the rehab currently he is getting physical therapy at home around 3 times a week. He is compliant with aspirin and Lipitor, denies any side effects from that. Denies any other neurologic concerns today. OUTPATIENT MEDICATIONS  tamsulosin 0.4 MG Oral Cap, Take 1 capsule (0.4 mg total) by mouth nightly., Disp: , Rfl:   lidocaine 5 % External Patch, Place 1 patch onto the skin daily as needed. , Disp: , Rfl:   diclofenac 1 % External Gel, APPLY 2 GM TO THE AFFECTED AREA(S)      TOPICALLY FOUR TIMES A DAY, Disp: , Rfl:   aspirin 81 MG Oral Tab EC, Take 1 tablet (81 mg total) by mouth daily. , Disp: 30 tablet, Rfl: 3  atorvastatin 80 MG Oral Tab, Take 1 tablet (80 mg total) by mouth nightly., Disp: 30 tablet, Rfl: 1  pantoprazole 40 MG Oral Tab EC, Take 1 tablet (40 mg total) by mouth every morning before breakfast., Disp: , Rfl:     No current facility-administered medications on file prior to visit.       MEDICAL HISTORY  Past Medical History:   Diagnosis Date    BPH 2011    cannot void so catheter indwelling but to have sg 1/11    CORONARY ARTERY DISEASE 2010    +heart scan score 300- stress test done    HYPERLIPIDEMIA     OTHER DISEASES     chr perf of rt tm hx of for 15 yr    OTHER DISEASES chr lt inguinal hernia    OTHER DISEASES     poor dentition       SURGICAL HISTORY  Past Surgical History:   Procedure Laterality Date    APPENDECTOMY      HERNIA SURGERY  2010    bilat inguinal    LAPAROSCOPIC REPAIR OF INITIAL  2/3/10    Performed by Marita Velasco at [facility]  2/3/10    Performed by Marita Velasco at [address]    OTHER SURGICAL HISTORY      neck surgery at age 13 for neck stuck to left    OTHER SURGICAL HISTORY  11-10-10    [facility]    OTHER SURGICAL HISTORY  12/22/10    Cystoscopy, Voiding Trial, Dr. Magui August  1/24/11    TURADRIANNE, Dr. Aquiles Abebe @ [facility]    OTHER SURGICAL HISTORY  5-    [facility] - Dr. Richard Cobian History    Socioeconomic History      Marital status:     Tobacco Use      Smoking status: Former        Packs/day: 0.50        Years: 30.00        Pack years: 15        Types: Cigarettes        Quit date: 1/1/2008        Years since quitting: 15.6      Smokeless tobacco: Never    Substance and Sexual Activity      Alcohol use: No      Drug use: No      FAMILY HISTORY  Family History   Problem Relation Age of Onset    Heart Disorder Father         mi at 62    Cancer Mother         leukemia       ALLERGIES  No Known Allergies    REVIEW OF SYSTEMS:   13-point review of systems was done and is negative unless otherwise stated in HPI. PHYSICAL EXAM:   /85   Pulse 75   SpO2 96%   General appearance: Well appearing, alert and in no acute distress  Skin: skin color normal.  No rashes or lesions. Head: Normocephalic, atraumatic.     Neurological exam:    Mental status   Alert and oriented   Attention/Concentration: intact attention on bedside test   Fund of knowledge: intact  Speech: no dysarthria or aphasia      Cranial nerves   II - visual fields intact to confrontation                                                III, IV, VI - extra-ocular muscles full: no pupillary defect; no SELENE, no nystagmus, no ptosis   V - normal facial sensation                                                               VII - normal facial symmetry                                                             VIII - intact hearing                                                                             IX, X - symmetrical palate                                                                  XI - symmetrical shoulder shrug                                                       XII - midline tongue without atrophy or fasciculation     Motor function  Normal muscle bulk and tone  Muscle strength:   Right upper and lower extremity-4/5, patient was able to antigravity right upper and lower extremity, using AFO for right foot drop. Intact on the left side     Sensory function Decreased light touch on the right upper and lower extremity     Cerebellar Finger to nose: Difficulty performing on the right side due to weakness, intact on the left       Reflex function Intact 2+ DTR and symmetric. Plantars upgoing on the right side     Gait                  Arises independently with difficulty  Patient was able to take few steps using a cane, hemiparetic gait due to right-sided weakness. LABS/DATA and IMAGING:  CT head 7/4/2023 did not show any acute intracranial abnormality. Nonspecific hypodensity noted in the left cerebral hemisphere. Chronic microvascular ischemic changes noted. CTA head and neck 7/4/2023 did not show any large vessel occlusion, relative rest vascularity in the left terminal cortical MCA territory concerning for evolving left frontoparietal infarct. MRI brain 7/5/2023  -large acute infarct involving left frontal corona radiator extending to dorsal aspect of the left basal nuclei.           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 Patient presented with acute onset right face arm and leg weakness, numbness and slurred speech. Patient was a thrombolysis candidate. Received IV tPA approximately at 2109 on 7/4/2023. Patient did not receive thrombectomy because no large vessel occlusion noted on CTA          Plan  Continue Aspirin 81 mg  Continue Lipitor 80 mg, goal LDL less than 70  Continue home physical therapy     STROKE RISK FACTORS:   *Hypertension - Target blood pressure <140/90, <130/85 for high risk, normal 120/80  *Physical inactivity - target exercise at least 3 times per week  *Obesity - target ideal body weight and girth <35\" for women, <40\" for men  *Diabetes - Target <6.5-7%   *Smoking - Target smoking cessation  *Hyperlipidemia - Target total cholesterol < 200, Target LDL <100, < 70 for high risk, Target HDL >45 for men, >55 for women, Target triglycerides <150       We will follow-up in clinic as needed  Instructed patient to call the clinic if symptoms worsen or do not any new neurologic concerns or if develop any side effects. This note was prepared using [email or web address] [facility] [employer] voice recognition dictation software and as a result, errors may occur. When identified, these errors have been corrected.  While every attempt is made to correct errors during dictation, discrepancies may still exist    Talisha Miller MD,